# Patient Record
Sex: FEMALE | Race: WHITE | NOT HISPANIC OR LATINO | Employment: FULL TIME | URBAN - METROPOLITAN AREA
[De-identification: names, ages, dates, MRNs, and addresses within clinical notes are randomized per-mention and may not be internally consistent; named-entity substitution may affect disease eponyms.]

---

## 2023-11-20 ENCOUNTER — OFFICE VISIT (OUTPATIENT)
Dept: OBSTETRICS AND GYNECOLOGY | Facility: CLINIC | Age: 58
End: 2023-11-20
Payer: COMMERCIAL

## 2023-11-20 VITALS
SYSTOLIC BLOOD PRESSURE: 120 MMHG | BODY MASS INDEX: 27.8 KG/M2 | DIASTOLIC BLOOD PRESSURE: 78 MMHG | HEIGHT: 66 IN | WEIGHT: 173 LBS

## 2023-11-20 DIAGNOSIS — Z01.419 WELL WOMAN EXAM WITH ROUTINE GYNECOLOGICAL EXAM: Primary | ICD-10-CM

## 2023-11-20 DIAGNOSIS — Z12.31 BREAST CANCER SCREENING BY MAMMOGRAM: ICD-10-CM

## 2023-11-20 DIAGNOSIS — N95.2 VAGINAL ATROPHY: ICD-10-CM

## 2023-11-20 LAB — PAP SMEAR, EXTERNAL: NEGATIVE

## 2023-11-20 PROCEDURE — 87624 HPV HI-RISK TYP POOLED RSLT: CPT

## 2023-11-20 PROCEDURE — 1036F TOBACCO NON-USER: CPT | Performed by: OBSTETRICS & GYNECOLOGY

## 2023-11-20 PROCEDURE — 88175 CYTOPATH C/V AUTO FLUID REDO: CPT

## 2023-11-20 PROCEDURE — 99386 PREV VISIT NEW AGE 40-64: CPT | Performed by: OBSTETRICS & GYNECOLOGY

## 2023-11-20 RX ORDER — ONDANSETRON HYDROCHLORIDE 4 MG/1
4 TABLET, FILM COATED ORAL
COMMUNITY
Start: 2023-11-19

## 2023-11-20 RX ORDER — THYROID, PORCINE 90 MG/1
TABLET ORAL
COMMUNITY
Start: 2023-02-08

## 2023-11-20 RX ORDER — FLUTICASONE PROPIONATE AND SALMETEROL XINAFOATE 115; 21 UG/1; UG/1
AEROSOL, METERED RESPIRATORY (INHALATION)
COMMUNITY
Start: 2019-08-01

## 2023-11-20 NOTE — PROGRESS NOTES
Patient presents for annual preventative care.  Last seen approximately 8 years ago.  Does note discomfort with intercourse due to dryness.  Discussed at length including use of lubricants as well as vaginal estrogen therapy.  Already using lubricants.  Have requested patient find out which formulation is preferred, as can be costly.  Patient prefers not to check and therefore will place prescription for Premarin cream.    REVIEW OF SYSTEMS    Please see HPI for reported pertinent positives, which would supersede this ROS    Constitutional:  Denies fever, chills, wt gain or loss, fatigue    Genito-Urinary:  Denies genital lesion or sores, vaginal dryness, itching  or pain.  No abnormal vaginal discharge or unexplained vaginal bleeding.  No dysuria, urinary incontinence or frequency.  Denies pelvic pain, dysmenorrhea or dyspareunia.    Eyes:  Denies vision changes, dryness  ENT:  No hearing loss, sinus pain or congestion, nosebleeds  Cardiovascular:  No chest pain or palpitations  Respiratory:  No SOB, cough, wheezing  GI:  No Nausea, vomiting, diarrhea, constipation, abdominal pain  Musculoskeletal:  No new back pain. joint pain, peripheral edema  Skin:  No rash or skin lesion  Neurologic:  No HA, numbness or dizziness  Psychiatric:  No new anxiety or depression  Endocrine:  No loss of hair or hirsutism  Hematologic/lymphatic:  No swollen lymph nodes.  No reported tendency for easy bruising or bleeding    Patient admits to no other systemic complaints      Vitals:    11/20/23 1603   BP: 120/78       PHYSICAL EXAM:    PSYCH:  Pt is alert, oriented and cooperative    SKIN: warm, dry, w/o lesion    HEAD AND FACE:  External inspection of eyes, ears, functional cranial nerves normal and intact    THYROID:  No thyromegaly    CARDIOVASCULAR:  Warm and well Perfused    PULMONARY:  No respiratory distress    ABDOMEN:  soft, nontender.  No mass or organomegaly.      BREAST:  Breasts are symmetric to inspection and palpation.   No mass palpable bilaterally.  There is no axillary lymphadenopathy    PELVIC:    External genitalia, urethra, perianal region normal to inspection and palpation if indicated.  No inguinal LA    Vagina without lesions.    Cervix seen and visually normal      Bimanual exam:      No pelvic mass palpable    Uterus nontender, midline in pelvis    No adnexal masses or tenderness    Assessment/Plan    Diagnoses and all orders for this visit:  Well woman exam with routine gynecological exam  -     THINPREP PAP TEST  Breast cancer screening by mammogram  Vaginal atrophy  -     estrogens, conjugated, (Premarin) vaginal cream; Insert 0.5 g into the vagina 3 times a week.

## 2023-12-07 LAB
CYTOLOGY CMNT CVX/VAG CYTO-IMP: NORMAL
HPV HR GENOTYPES PNL CVX NAA+PROBE: NEGATIVE
LAB AP HPV GENOTYPE QUESTION: NO
LAB AP HPV HR: NORMAL
LABORATORY COMMENT REPORT: NORMAL
LMP START DATE: NORMAL
MENSTRUAL HX REPORTED: NORMAL
PATH REPORT.TOTAL CANCER: NORMAL

## 2023-12-21 LAB — MAMMOGRAPHY, EXTERNAL: NEGATIVE

## 2024-11-15 ENCOUNTER — OFFICE VISIT (OUTPATIENT)
Dept: FAMILY MEDICINE CLINIC | Age: 59
End: 2024-11-15

## 2024-11-15 VITALS
WEIGHT: 173 LBS | HEIGHT: 65 IN | BODY MASS INDEX: 28.82 KG/M2 | OXYGEN SATURATION: 99 % | DIASTOLIC BLOOD PRESSURE: 88 MMHG | SYSTOLIC BLOOD PRESSURE: 120 MMHG | HEART RATE: 83 BPM

## 2024-11-15 DIAGNOSIS — R73.03 PRE-DIABETES: ICD-10-CM

## 2024-11-15 DIAGNOSIS — Z76.89 ENCOUNTER TO ESTABLISH CARE: ICD-10-CM

## 2024-11-15 DIAGNOSIS — R09.1 PLEURISY WITHOUT EFFUSION: ICD-10-CM

## 2024-11-15 DIAGNOSIS — J45.31 MILD PERSISTENT ASTHMATIC BRONCHITIS WITH ACUTE EXACERBATION: ICD-10-CM

## 2024-11-15 DIAGNOSIS — Z09 HOSPITAL DISCHARGE FOLLOW-UP: Primary | ICD-10-CM

## 2024-11-15 PROBLEM — E78.00 ELEVATED LDL CHOLESTEROL LEVEL: Status: RESOLVED | Noted: 2017-04-20 | Resolved: 2024-11-15

## 2024-11-15 PROBLEM — N20.0 KIDNEY STONE: Status: RESOLVED | Noted: 2024-11-15 | Resolved: 2024-11-15

## 2024-11-15 PROBLEM — D13.6: Status: ACTIVE | Noted: 2022-11-22

## 2024-11-15 PROBLEM — R42 DIZZINESS: Status: RESOLVED | Noted: 2017-10-18 | Resolved: 2024-11-15

## 2024-11-15 PROBLEM — J45.20 MILD INTERMITTENT ASTHMA: Status: ACTIVE | Noted: 2024-11-15

## 2024-11-15 PROBLEM — J30.9 ALLERGIC RHINITIS: Status: ACTIVE | Noted: 2024-11-11

## 2024-11-15 PROBLEM — R11.0 NAUSEA: Status: RESOLVED | Noted: 2017-10-18 | Resolved: 2024-11-15

## 2024-11-15 PROBLEM — N28.89 RENAL MASS: Status: ACTIVE | Noted: 2022-11-22

## 2024-11-15 PROBLEM — Z77.29 EXPOSURE TO POTENTIALLY HAZARDOUS SUBSTANCE: Status: RESOLVED | Noted: 2017-10-18 | Resolved: 2024-11-15

## 2024-11-15 PROBLEM — R51.9 HEAD ACHE: Status: RESOLVED | Noted: 2017-10-18 | Resolved: 2024-11-15

## 2024-11-15 PROBLEM — J18.9 PNEUMONIA: Status: RESOLVED | Noted: 2024-11-15 | Resolved: 2024-11-15

## 2024-11-15 PROBLEM — J45.909 ASTHMATIC BRONCHITIS: Status: ACTIVE | Noted: 2024-11-06

## 2024-11-15 PROBLEM — M12.9 ARTHROPATHY: Status: ACTIVE | Noted: 2024-11-15

## 2024-11-15 PROBLEM — C73 MALIGNANT TUMOR OF THYROID GLAND (HCC): Status: RESOLVED | Noted: 2024-11-15 | Resolved: 2024-11-15

## 2024-11-15 RX ORDER — PREDNISONE 20 MG/1
20 TABLET ORAL
COMMUNITY
Start: 2024-11-06 | End: 2024-11-15 | Stop reason: ALTCHOICE

## 2024-11-15 RX ORDER — CONJUGATED ESTROGENS 0.62 MG/G
0.5 CREAM VAGINAL
COMMUNITY
Start: 2023-11-20

## 2024-11-15 RX ORDER — ALBUTEROL SULFATE 90 UG/1
2 INHALANT RESPIRATORY (INHALATION) EVERY 6 HOURS PRN
COMMUNITY
Start: 2024-11-06

## 2024-11-15 RX ORDER — LEVOFLOXACIN 500 MG/1
500 TABLET, FILM COATED ORAL DAILY
COMMUNITY
Start: 2024-11-06 | End: 2024-11-15 | Stop reason: CLARIF

## 2024-11-15 RX ORDER — CEFDINIR 300 MG/1
300 CAPSULE ORAL 2 TIMES DAILY
COMMUNITY
Start: 2024-10-21 | End: 2024-11-15 | Stop reason: CLARIF

## 2024-11-15 RX ORDER — FLUTICASONE PROPIONATE 50 MCG
2 SPRAY, SUSPENSION (ML) NASAL DAILY
COMMUNITY
Start: 2024-11-11

## 2024-11-15 RX ORDER — FLUTICASONE PROPIONATE AND SALMETEROL XINAFOATE 115; 21 UG/1; UG/1
2 AEROSOL, METERED RESPIRATORY (INHALATION) 2 TIMES DAILY
COMMUNITY
Start: 2024-10-26

## 2024-11-15 RX ORDER — IPRATROPIUM BROMIDE AND ALBUTEROL SULFATE 2.5; .5 MG/3ML; MG/3ML
1 SOLUTION RESPIRATORY (INHALATION) 4 TIMES DAILY
COMMUNITY
Start: 2024-11-11

## 2024-11-15 RX ORDER — LIOTHYRONINE SODIUM 5 UG/1
5 TABLET ORAL 2 TIMES DAILY
COMMUNITY
Start: 2024-10-17

## 2024-11-15 RX ORDER — THYROID,PORK 90 MG
90 TABLET ORAL DAILY
COMMUNITY
Start: 2024-10-17

## 2024-11-15 SDOH — ECONOMIC STABILITY: FOOD INSECURITY: WITHIN THE PAST 12 MONTHS, YOU WORRIED THAT YOUR FOOD WOULD RUN OUT BEFORE YOU GOT MONEY TO BUY MORE.: NEVER TRUE

## 2024-11-15 SDOH — ECONOMIC STABILITY: INCOME INSECURITY: HOW HARD IS IT FOR YOU TO PAY FOR THE VERY BASICS LIKE FOOD, HOUSING, MEDICAL CARE, AND HEATING?: NOT HARD AT ALL

## 2024-11-15 SDOH — ECONOMIC STABILITY: FOOD INSECURITY: WITHIN THE PAST 12 MONTHS, THE FOOD YOU BOUGHT JUST DIDN'T LAST AND YOU DIDN'T HAVE MONEY TO GET MORE.: NEVER TRUE

## 2024-11-15 ASSESSMENT — PATIENT HEALTH QUESTIONNAIRE - PHQ9
SUM OF ALL RESPONSES TO PHQ QUESTIONS 1-9: 0
SUM OF ALL RESPONSES TO PHQ QUESTIONS 1-9: 0
SUM OF ALL RESPONSES TO PHQ9 QUESTIONS 1 & 2: 0
SUM OF ALL RESPONSES TO PHQ QUESTIONS 1-9: 0
SUM OF ALL RESPONSES TO PHQ QUESTIONS 1-9: 0
1. LITTLE INTEREST OR PLEASURE IN DOING THINGS: NOT AT ALL
2. FEELING DOWN, DEPRESSED OR HOPELESS: NOT AT ALL

## 2024-11-20 ASSESSMENT — ENCOUNTER SYMPTOMS
COUGH: 1
WHEEZING: 0
VOMITING: 0
DIARRHEA: 0
SORE THROAT: 0
SHORTNESS OF BREATH: 0
TROUBLE SWALLOWING: 0
NAUSEA: 0
RHINORRHEA: 0
EYE REDNESS: 0

## 2025-02-11 SDOH — ECONOMIC STABILITY: FOOD INSECURITY: WITHIN THE PAST 12 MONTHS, THE FOOD YOU BOUGHT JUST DIDN'T LAST AND YOU DIDN'T HAVE MONEY TO GET MORE.: NEVER TRUE

## 2025-02-11 SDOH — ECONOMIC STABILITY: FOOD INSECURITY: WITHIN THE PAST 12 MONTHS, YOU WORRIED THAT YOUR FOOD WOULD RUN OUT BEFORE YOU GOT MONEY TO BUY MORE.: NEVER TRUE

## 2025-02-11 SDOH — ECONOMIC STABILITY: TRANSPORTATION INSECURITY
IN THE PAST 12 MONTHS, HAS LACK OF TRANSPORTATION KEPT YOU FROM MEETINGS, WORK, OR FROM GETTING THINGS NEEDED FOR DAILY LIVING?: NO

## 2025-02-11 SDOH — ECONOMIC STABILITY: TRANSPORTATION INSECURITY
IN THE PAST 12 MONTHS, HAS THE LACK OF TRANSPORTATION KEPT YOU FROM MEDICAL APPOINTMENTS OR FROM GETTING MEDICATIONS?: NO

## 2025-02-11 SDOH — ECONOMIC STABILITY: INCOME INSECURITY: IN THE LAST 12 MONTHS, WAS THERE A TIME WHEN YOU WERE NOT ABLE TO PAY THE MORTGAGE OR RENT ON TIME?: NO

## 2025-02-11 ASSESSMENT — PATIENT HEALTH QUESTIONNAIRE - PHQ9
SUM OF ALL RESPONSES TO PHQ9 QUESTIONS 1 & 2: 0
SUM OF ALL RESPONSES TO PHQ QUESTIONS 1-9: 0
SUM OF ALL RESPONSES TO PHQ QUESTIONS 1-9: 0
2. FEELING DOWN, DEPRESSED OR HOPELESS: NOT AT ALL
1. LITTLE INTEREST OR PLEASURE IN DOING THINGS: NOT AT ALL
2. FEELING DOWN, DEPRESSED OR HOPELESS: NOT AT ALL
SUM OF ALL RESPONSES TO PHQ9 QUESTIONS 1 & 2: 0
SUM OF ALL RESPONSES TO PHQ QUESTIONS 1-9: 0
1. LITTLE INTEREST OR PLEASURE IN DOING THINGS: NOT AT ALL
SUM OF ALL RESPONSES TO PHQ QUESTIONS 1-9: 0

## 2025-02-14 ENCOUNTER — OFFICE VISIT (OUTPATIENT)
Dept: FAMILY MEDICINE CLINIC | Age: 60
End: 2025-02-14
Payer: COMMERCIAL

## 2025-02-14 VITALS
SYSTOLIC BLOOD PRESSURE: 116 MMHG | TEMPERATURE: 97.1 F | BODY MASS INDEX: 29.99 KG/M2 | HEIGHT: 65 IN | WEIGHT: 180 LBS | HEART RATE: 75 BPM | OXYGEN SATURATION: 98 % | DIASTOLIC BLOOD PRESSURE: 82 MMHG

## 2025-02-14 DIAGNOSIS — R73.03 PRE-DIABETES: Primary | ICD-10-CM

## 2025-02-14 LAB — HBA1C MFR BLD: 5.6 %

## 2025-02-14 PROCEDURE — 99213 OFFICE O/P EST LOW 20 MIN: CPT | Performed by: NURSE PRACTITIONER

## 2025-02-14 PROCEDURE — 83036 HEMOGLOBIN GLYCOSYLATED A1C: CPT | Performed by: NURSE PRACTITIONER

## 2025-02-14 NOTE — PROGRESS NOTES
[facility]  [address] [place] [id number]  Dept: [phone]  Dept Fax: [phone]  Loc: [phone]     Chief Complaint  Chief Complaint   Patient presents with    Pre-Diabetes     3 month f/u       HPI:  59 y.o.female who presents for the following:      ***    Review of Systems    Past Medical History:   Diagnosis Date    Asthma 2014    Aural vertigo of left ear 08/03/2016    COPD (chronic obstructive pulmonary disease) (HCC) 2014    Dizziness 10/18/2017    Elevated LDL cholesterol level 04/20/2017    Exposure to potentially hazardous substance 10/18/2017    Head ache 10/18/2017    Hyperacusis 08/03/2016    Hypothyroidism 2012    Kidney stone 11/15/2024    Malignant tumor of thyroid gland (HCC) 11/15/2024    2012 papillary/follicular q 6 months onc (endo?) visit      Nausea 10/18/2017    Osteoarthritis 2015    Pneumonia 11/15/2024    Prediabetes     Thyroid cancer (HCC) 2012     Past Surgical History:   Procedure Laterality Date    BREAST FIBROADENOMA SURGERY Left 1996    IR EMBOLIZATION TUMOR/ORGAN  2006    THYROIDECTOMY, COMPLETION  2012    TUBAL LIGATION  2006    UTERINE FIBROID SURGERY  2006     Social History     Socioeconomic History    Marital status:      Spouse name: Not on file    Number of children: Not on file    Years of education: Not on file    Highest education level: Not on file   Occupational History    Occupation:    Tobacco Use    Smoking status: Former     Current packs/day: 0.00     Average packs/day: 0.1 packs/day for 20.0 years (2.0 ttl pk-yrs)     Types: Cigarettes     Start date: 1/1/1990     Quit date: 2010     Years since quitting: 15.1    Smokeless tobacco: Never   Vaping Use    Vaping status: Never Used   Substance and Sexual Activity    Alcohol use: Never    Drug use: Never    Sexual activity: Yes     Partners: Male   Other Topics Concern    Not on file   Social 
deficit present.      Mental Status: She is alert and oriented to person, place, and time.   Psychiatric:         Mood and Affect: Mood normal.         Behavior: Behavior normal. Behavior is cooperative.         Assessment/Plan:  59 y.o. female here mainly for :  - Pre DM- Pt made lifestyle changes and A1C improved to 5.6. She will continue her habits and continue to try to lose weight. She wants to keep a close eye on this number so she opted to come back in 3 months to re check.      Diagnosis Orders   1. Pre-diabetes  POCT glycosylated hemoglobin (Hb A1C)           Return in about 3 months (around 5/14/2025) for A1C.    Ellie Puente, APRN - CNP

## 2025-02-17 ASSESSMENT — ENCOUNTER SYMPTOMS
VOMITING: 0
SORE THROAT: 0
RHINORRHEA: 0
NAUSEA: 0
WHEEZING: 0
EYE REDNESS: 0
COUGH: 0
TROUBLE SWALLOWING: 0
DIARRHEA: 0
SHORTNESS OF BREATH: 0